# Patient Record
Sex: FEMALE | Race: AMERICAN INDIAN OR ALASKA NATIVE
[De-identification: names, ages, dates, MRNs, and addresses within clinical notes are randomized per-mention and may not be internally consistent; named-entity substitution may affect disease eponyms.]

---

## 2017-03-12 ENCOUNTER — HOSPITAL ENCOUNTER (INPATIENT)
Dept: HOSPITAL 5 - ED | Age: 72
LOS: 2 days | Discharge: HOME | DRG: 922 | End: 2017-03-14
Attending: INTERNAL MEDICINE | Admitting: INTERNAL MEDICINE
Payer: MEDICARE

## 2017-03-12 DIAGNOSIS — R41.0: ICD-10-CM

## 2017-03-12 DIAGNOSIS — F23: ICD-10-CM

## 2017-03-12 DIAGNOSIS — G93.40: ICD-10-CM

## 2017-03-12 DIAGNOSIS — E78.5: ICD-10-CM

## 2017-03-12 DIAGNOSIS — T68.XXXA: Primary | ICD-10-CM

## 2017-03-12 DIAGNOSIS — Z79.899: ICD-10-CM

## 2017-03-12 DIAGNOSIS — F29: ICD-10-CM

## 2017-03-12 DIAGNOSIS — F32.9: ICD-10-CM

## 2017-03-12 DIAGNOSIS — E05.90: ICD-10-CM
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
## 2017-03-12 DIAGNOSIS — I10: ICD-10-CM

## 2017-03-12 DIAGNOSIS — E86.0: ICD-10-CM

## 2017-03-12 LAB
ALBUMIN SERPL-MCNC: 4.1 G/DL (ref 3.9–5)
ALBUMIN/GLOB SERPL: 0.9 %
ALP SERPL-CCNC: 58 UNITS/L (ref 35–129)
ALT SERPL-CCNC: 15 UNITS/L (ref 7–56)
ANION GAP SERPL CALC-SCNC: 22 MMOL/L
BASOPHILS NFR BLD AUTO: 0.4 % (ref 0–1.8)
BILIRUB SERPL-MCNC: 0.6 MG/DL (ref 0.1–1.2)
BILIRUB UR QL STRIP: (no result)
BLOOD UR QL VISUAL: (no result)
BUN SERPL-MCNC: 31 MG/DL (ref 7–17)
BUN/CREAT SERPL: 51.66 %
CALCIUM SERPL-MCNC: 10 MG/DL (ref 8.4–10.2)
CHLORIDE SERPL-SCNC: 95.8 MMOL/L (ref 98–107)
CO2 SERPL-SCNC: 24 MMOL/L (ref 22–30)
EOSINOPHIL NFR BLD AUTO: 0.5 % (ref 0–4.3)
GLUCOSE SERPL-MCNC: 108 MG/DL (ref 65–100)
HCT VFR BLD CALC: 46.6 % (ref 30.3–42.9)
HGB BLD-MCNC: 15.2 GM/DL (ref 10.1–14.3)
KETONES UR STRIP-MCNC: 20 MG/DL
LEUKOCYTE ESTERASE UR QL STRIP: (no result)
MAGNESIUM SERPL-MCNC: 2.2 MG/DL (ref 1.7–2.3)
MCH RBC QN AUTO: 30 PG (ref 28–32)
MCHC RBC AUTO-ENTMCNC: 33 % (ref 30–34)
MCV RBC AUTO: 91 FL (ref 79–97)
MUCOUS THREADS #/AREA URNS HPF: (no result) /HPF
NITRITE UR QL STRIP: (no result)
PH UR STRIP: 5 [PH] (ref 5–7)
PLATELET # BLD: 139 K/MM3 (ref 140–440)
POTASSIUM SERPL-SCNC: 3.7 MMOL/L (ref 3.6–5)
PROT SERPL-MCNC: 8.5 G/DL (ref 6.3–8.2)
RBC # BLD AUTO: 5.14 M/MM3 (ref 3.65–5.03)
RBC #/AREA URNS HPF: < 1 /HPF (ref 0–6)
SODIUM SERPL-SCNC: 138 MMOL/L (ref 137–145)
URINE DRUGS OF ABUSE NOTE: (no result)
UROBILINOGEN UR-MCNC: < 2 MG/DL (ref ?–2)
WBC # BLD AUTO: 4.9 K/MM3 (ref 4.5–11)
WBC #/AREA URNS HPF: 1 /HPF (ref 0–6)

## 2017-03-12 PROCEDURE — 90732 PPSV23 VACC 2 YRS+ SUBQ/IM: CPT

## 2017-03-12 PROCEDURE — 84443 ASSAY THYROID STIM HORMONE: CPT

## 2017-03-12 PROCEDURE — 83735 ASSAY OF MAGNESIUM: CPT

## 2017-03-12 PROCEDURE — 93005 ELECTROCARDIOGRAM TRACING: CPT

## 2017-03-12 PROCEDURE — 81001 URINALYSIS AUTO W/SCOPE: CPT

## 2017-03-12 PROCEDURE — 82140 ASSAY OF AMMONIA: CPT

## 2017-03-12 PROCEDURE — 84439 ASSAY OF FREE THYROXINE: CPT

## 2017-03-12 PROCEDURE — 70450 CT HEAD/BRAIN W/O DYE: CPT

## 2017-03-12 PROCEDURE — 80053 COMPREHEN METABOLIC PANEL: CPT

## 2017-03-12 PROCEDURE — 85025 COMPLETE CBC W/AUTO DIFF WBC: CPT

## 2017-03-12 PROCEDURE — 80307 DRUG TEST PRSMV CHEM ANLYZR: CPT

## 2017-03-12 PROCEDURE — 93010 ELECTROCARDIOGRAM REPORT: CPT

## 2017-03-12 PROCEDURE — 87040 BLOOD CULTURE FOR BACTERIA: CPT

## 2017-03-12 PROCEDURE — 80320 DRUG SCREEN QUANTALCOHOLS: CPT

## 2017-03-12 PROCEDURE — 71020: CPT

## 2017-03-12 PROCEDURE — 36415 COLL VENOUS BLD VENIPUNCTURE: CPT

## 2017-03-12 PROCEDURE — G0480 DRUG TEST DEF 1-7 CLASSES: HCPCS

## 2017-03-12 NOTE — EMERGENCY DEPARTMENT REPORT
ED Altered Mental Status HPI





- General


Chief Complaint: Psych


Stated Complaint: HYPOTHERMIA


Time Seen by Provider: 03/12/17 09:43


Source: patient, EMS


Mode of arrival: Stretcher


Limitations: Altered Mental Status





- History of Present Illness


Initial Comments: 





71-year-old female with past medical history hypertension and high cholesterol 

presents to the hospital with hypothermia.  Patient was found by bystanders 

lying in the field on the ground.  Patient apparently had a message from God.  

She would not share that message with me however she stated to triage nurse 

that "I have sinned so I laid down to die"  and she is having problems with her 

son. Pt left home some time last night..  Temperature outside was in the high 

30s to 40s, cold, and raining.  Patient presented shivering and wet.  Patient 

denies any pain.  She has a son with schizophrenia but denies any personal 

psychiatric diagnoses.





- Related Data


 Previous Rx's











 Medication  Instructions  Recorded  Last Taken  Type


 


Docusate Sodium [Colace] 100 mg PO BID PRN #120 capsule 03/03/17 Unknown Rx











 Allergies











Allergy/AdvReac Type Severity Reaction Status Date / Time


 


No Known Allergies Allergy   Unverified 03/03/17 14:16














ED Review of Systems


ROS: 


Stated complaint: HYPOTHERMIA


Other details as noted in HPI





Comment: All other systems reviewed and negative


Other: 





Constitutional: No fevers chills 


Eyes: No eye pain visual changes 


ENT: No ear pain or throat pain


Neck: Denies pain


Respiratory: Denies cough wheezing shortness of breath 


Cardiovascular: Denies chest pain, palpitations, syncope


GI: Denies abdominal pain, nausea, vomiting, diarrhea


: Denies dysuria


Musculoskeletal: Denies back pain, joint swelling 


Skin: Denies rash, lesions, erythema


Neurologic: Denies headache, numbness, weakness








ED Past Medical Hx





- Past Medical History


Hx Hypertension: Yes


Additional medical history: high cholesterol





- Social History


Smoking Status: Never Smoker


Substance Use Type: None





- Medications


Home Medications: 


 Home Medications











 Medication  Instructions  Recorded  Confirmed  Last Taken  Type


 


Docusate Sodium [Colace] 100 mg PO BID PRN #120 capsule 03/03/17  Unknown Rx














ED Physical Exam





- General


Limitations: Altered Mental Status





- Other


Other exam information: 





General: No limitations, patient is alert in no acute distress


Head exam: Atraumatic, normocephalic


Eyes exam: Normal appearance, pupils equal reactive to light, extraocular 

movements intact


ENT: Moist mucous membrane, normal oropharynx


Neck exam: Normal inspection, full range of motion, no meningismus nontender


Respiratory exam: Clear to auscultation bilateral, no wheezes, rales, crackles


Cardiovascular: Normal rate and rhythm, normal heart sounds


Abdomen: Soft, nondistended, and  nontender, with normal bowel sounds, no 

rebound, or guarding


Extremity: Full range of motion normal inspection no deformity


Back: Normal Inspection, full range of motion, no tenderness


Neurologic: Alert, oriented x3, cranial nerves intact, no motor or sensory 

deficit


Psychiatric: normal affect, normal mood


Skin: Warm, dry, intact





ED Course


 Vital Signs











  03/12/17





  09:32


 


Temperature 94.7 F L


 


Pulse Rate 63


 


Respiratory 16





Rate 


 


Blood Pressure 129/72


 


O2 Sat by Pulse 100





Oximetry 














- Reevaluation(s)


Reevaluation #1: 





03/12/17 10:45


Active warming initiated Shortly after patient arrival


03/12/17 10:45








- Lab Data


Result diagrams: 


 03/12/17 09:50





 03/12/17 09:50


 Lab Results











  03/12/17 03/12/17 03/12/17 Range/Units





  09:39 09:39 09:50 


 


WBC    4.9  (4.5-11.0)  K/mm3


 


RBC    5.14 H  (3.65-5.03)  M/mm3


 


Hgb    15.2 H  (10.1-14.3)  gm/dl


 


Hct    46.6 H  (30.3-42.9)  %


 


MCV    91  (79-97)  fl


 


MCH    30  (28-32)  pg


 


MCHC    33  (30-34)  %


 


RDW    13.2  (13.2-15.2)  %


 


Plt Count    139 L  (140-440)  K/mm3


 


Lymph % (Auto)    22.3  (13.4-35.0)  %


 


Mono % (Auto)    9.6 H  (0.0-7.3)  %


 


Eos % (Auto)    0.5  (0.0-4.3)  %


 


Baso % (Auto)    0.4  (0.0-1.8)  %


 


Lymph #    1.1 L  (1.2-5.4)  K/mm3


 


Mono #    0.5  (0.0-0.8)  K/mm3


 


Eos #    0.0  (0.0-0.4)  K/mm3


 


Baso #    0.0  (0.0-0.1)  K/mm3


 


Seg Neutrophils %    67.2  (40.0-70.0)  %


 


Seg Neutrophils #    3.3  (1.8-7.7)  K/mm3


 


Sodium     (137-145)  mmol/L


 


Potassium     (3.6-5.0)  mmol/L


 


Chloride     ()  mmol/L


 


Carbon Dioxide     (22-30)  mmol/L


 


Anion Gap     mmol/L


 


BUN     (7-17)  mg/dL


 


Creatinine     (0.7-1.2)  mg/dL


 


Estimated GFR     ml/min


 


BUN/Creatinine Ratio     %


 


Glucose     ()  mg/dL


 


Lactic Acid     (0.7-2.0)  mmol/L


 


Calcium     (8.4-10.2)  mg/dL


 


Magnesium     (1.7-2.3)  mg/dL


 


Total Bilirubin     (0.1-1.2)  mg/dL


 


AST     (5-40)  units/L


 


ALT     (7-56)  units/L


 


Alkaline Phosphatase     ()  units/L


 


Total Protein     (6.3-8.2)  g/dL


 


Albumin     (3.9-5)  g/dL


 


Albumin/Globulin Ratio     %


 


Urine Color  Yellow    (Yellow)  


 


Urine Turbidity  Clear    (Clear)  


 


Urine pH  5.0    (5.0-7.0)  


 


Ur Specific Gravity  1.028    (1.003-1.030)  


 


Urine Protein  30 mg/dl    (Negative)  mg/dL


 


Urine Glucose (UA)  Neg    (Negative)  mg/dL


 


Urine Ketones  20    (Negative)  mg/dL


 


Urine Blood  Neg    (Negative)  


 


Urine Nitrite  Neg    (Negative)  


 


Urine Bilirubin  Neg    (Negative)  


 


Urine Urobilinogen  < 2.0    (<2.0)  mg/dL


 


Ur Leukocyte Esterase  Neg    (Negative)  


 


Urine WBC (Auto)  1.0    (0.0-6.0)  /HPF


 


Urine RBC (Auto)  < 1.0    (0.0-6.0)  /HPF


 


Urine Mucus  1+    /HPF


 


Urine Opiates Screen   Presumptive negative   


 


Urine Methadone Screen   Presumptive negative   


 


Acetaminophen     (10.0-30.0)  ug/mL


 


Ur Barbiturates Screen   Presumptive negative   


 


Ur Phencyclidine Scrn   Presumptive negative   


 


Ur Amphetamines Screen   Presumptive negative   


 


U Benzodiazepines Scrn   Presumptive negative   


 


Urine Cocaine Screen   Presumptive negative   


 


U Marijuana (THC) Screen   Presumptive negative   


 


Drugs of Abuse Note   Disclamer   


 


Plasma/Serum Alcohol     (0-0.07)  gm%














  03/12/17 03/12/17 03/12/17 Range/Units





  09:50 09:50 09:50 


 


WBC     (4.5-11.0)  K/mm3


 


RBC     (3.65-5.03)  M/mm3


 


Hgb     (10.1-14.3)  gm/dl


 


Hct     (30.3-42.9)  %


 


MCV     (79-97)  fl


 


MCH     (28-32)  pg


 


MCHC     (30-34)  %


 


RDW     (13.2-15.2)  %


 


Plt Count     (140-440)  K/mm3


 


Lymph % (Auto)     (13.4-35.0)  %


 


Mono % (Auto)     (0.0-7.3)  %


 


Eos % (Auto)     (0.0-4.3)  %


 


Baso % (Auto)     (0.0-1.8)  %


 


Lymph #     (1.2-5.4)  K/mm3


 


Mono #     (0.0-0.8)  K/mm3


 


Eos #     (0.0-0.4)  K/mm3


 


Baso #     (0.0-0.1)  K/mm3


 


Seg Neutrophils %     (40.0-70.0)  %


 


Seg Neutrophils #     (1.8-7.7)  K/mm3


 


Sodium  138    (137-145)  mmol/L


 


Potassium  3.7    (3.6-5.0)  mmol/L


 


Chloride  95.8 L    ()  mmol/L


 


Carbon Dioxide  24    (22-30)  mmol/L


 


Anion Gap  22    mmol/L


 


BUN  31 H    (7-17)  mg/dL


 


Creatinine  0.6 L    (0.7-1.2)  mg/dL


 


Estimated GFR  > 60    ml/min


 


BUN/Creatinine Ratio  51.66    %


 


Glucose  108 H    ()  mg/dL


 


Lactic Acid   1.9   (0.7-2.0)  mmol/L


 


Calcium  10.0    (8.4-10.2)  mg/dL


 


Magnesium  2.2    (1.7-2.3)  mg/dL


 


Total Bilirubin  0.6    (0.1-1.2)  mg/dL


 


AST  23    (5-40)  units/L


 


ALT  15    (7-56)  units/L


 


Alkaline Phosphatase  58    ()  units/L


 


Total Protein  8.5 H    (6.3-8.2)  g/dL


 


Albumin  4.1    (3.9-5)  g/dL


 


Albumin/Globulin Ratio  0.9    %


 


Urine Color     (Yellow)  


 


Urine Turbidity     (Clear)  


 


Urine pH     (5.0-7.0)  


 


Ur Specific Gravity     (1.003-1.030)  


 


Urine Protein     (Negative)  mg/dL


 


Urine Glucose (UA)     (Negative)  mg/dL


 


Urine Ketones     (Negative)  mg/dL


 


Urine Blood     (Negative)  


 


Urine Nitrite     (Negative)  


 


Urine Bilirubin     (Negative)  


 


Urine Urobilinogen     (<2.0)  mg/dL


 


Ur Leukocyte Esterase     (Negative)  


 


Urine WBC (Auto)     (0.0-6.0)  /HPF


 


Urine RBC (Auto)     (0.0-6.0)  /HPF


 


Urine Mucus     /HPF


 


Urine Opiates Screen     


 


Urine Methadone Screen     


 


Acetaminophen    < 15.0  (10.0-30.0)  ug/mL


 


Ur Barbiturates Screen     


 


Ur Phencyclidine Scrn     


 


Ur Amphetamines Screen     


 


U Benzodiazepines Scrn     


 


Urine Cocaine Screen     


 


U Marijuana (THC) Screen     


 


Drugs of Abuse Note     


 


Plasma/Serum Alcohol     (0-0.07)  gm%














  03/12/17 Range/Units





  09:50 


 


WBC   (4.5-11.0)  K/mm3


 


RBC   (3.65-5.03)  M/mm3


 


Hgb   (10.1-14.3)  gm/dl


 


Hct   (30.3-42.9)  %


 


MCV   (79-97)  fl


 


MCH   (28-32)  pg


 


MCHC   (30-34)  %


 


RDW   (13.2-15.2)  %


 


Plt Count   (140-440)  K/mm3


 


Lymph % (Auto)   (13.4-35.0)  %


 


Mono % (Auto)   (0.0-7.3)  %


 


Eos % (Auto)   (0.0-4.3)  %


 


Baso % (Auto)   (0.0-1.8)  %


 


Lymph #   (1.2-5.4)  K/mm3


 


Mono #   (0.0-0.8)  K/mm3


 


Eos #   (0.0-0.4)  K/mm3


 


Baso #   (0.0-0.1)  K/mm3


 


Seg Neutrophils %   (40.0-70.0)  %


 


Seg Neutrophils #   (1.8-7.7)  K/mm3


 


Sodium   (137-145)  mmol/L


 


Potassium   (3.6-5.0)  mmol/L


 


Chloride   ()  mmol/L


 


Carbon Dioxide   (22-30)  mmol/L


 


Anion Gap   mmol/L


 


BUN   (7-17)  mg/dL


 


Creatinine   (0.7-1.2)  mg/dL


 


Estimated GFR   ml/min


 


BUN/Creatinine Ratio   %


 


Glucose   ()  mg/dL


 


Lactic Acid   (0.7-2.0)  mmol/L


 


Calcium   (8.4-10.2)  mg/dL


 


Magnesium   (1.7-2.3)  mg/dL


 


Total Bilirubin   (0.1-1.2)  mg/dL


 


AST   (5-40)  units/L


 


ALT   (7-56)  units/L


 


Alkaline Phosphatase   ()  units/L


 


Total Protein   (6.3-8.2)  g/dL


 


Albumin   (3.9-5)  g/dL


 


Albumin/Globulin Ratio   %


 


Urine Color   (Yellow)  


 


Urine Turbidity   (Clear)  


 


Urine pH   (5.0-7.0)  


 


Ur Specific Gravity   (1.003-1.030)  


 


Urine Protein   (Negative)  mg/dL


 


Urine Glucose (UA)   (Negative)  mg/dL


 


Urine Ketones   (Negative)  mg/dL


 


Urine Blood   (Negative)  


 


Urine Nitrite   (Negative)  


 


Urine Bilirubin   (Negative)  


 


Urine Urobilinogen   (<2.0)  mg/dL


 


Ur Leukocyte Esterase   (Negative)  


 


Urine WBC (Auto)   (0.0-6.0)  /HPF


 


Urine RBC (Auto)   (0.0-6.0)  /HPF


 


Urine Mucus   /HPF


 


Urine Opiates Screen   


 


Urine Methadone Screen   


 


Acetaminophen   (10.0-30.0)  ug/mL


 


Ur Barbiturates Screen   


 


Ur Phencyclidine Scrn   


 


Ur Amphetamines Screen   


 


U Benzodiazepines Scrn   


 


Urine Cocaine Screen   


 


U Marijuana (THC) Screen   


 


Drugs of Abuse Note   


 


Plasma/Serum Alcohol  < 0.01  (0-0.07)  gm%














- EKG Data


-: EKG Interpreted by Me (nsr rate 60)


When compared to previous EKG there are: no significant change (compared to 3/3/

17)





- Medical Decision Making





She will be admitted to the hospital for further treatment of hyperthermia.  

Thyroid function tests pending at disposition.  Patient may also a mental 

health evaluation of psychosis does not improve with treatment.  Patient also 

has mild dehydration as evident by elevated BUN.  Normal saline initiated





- Differential Diagnosis


dementia, psychosis, infection, delirium


Critical Care Time: No


Critical care attestation.: 


If time is entered above; I have spent that time in minutes in the direct care 

of this critically ill patient, excluding procedure time.








ED Disposition


Clinical Impression: 


 Psychosis, Dehydration





Hypothermia


Qualifiers:


 Encounter type: initial encounter Qualified Code(s): T68.XXXA - Hypothermia, 

initial encounter





Disposition: OP ADMITTED AS IP TO THIS HOSP


Is pt being admited?: Yes


Condition: Stable


Time of Disposition: 11:23 (Dr Jansen/hosp)

## 2017-03-12 NOTE — CAT SCAN REPORT
CT HEAD WITHOUT CONTRAST:



HISTORY: Intermittent confusion, altered mental status.



TECHNIQUE:

Sequential CT images without contrast.



FINDINGS:

Images obtained show bilateral prominence of the sulci and

ventricles.  There are no abnormal intra- or extra-axial blood or

fluid collections.  There are no focal masses or evidence of mass

effect. The gray white matter differentiation appears within normal 

limits.  Regions of periventricular decreased attenuation are 

consistent with microangiopathic ischemic disease.  The posterior fossa 

structures including the fourth ventricle, cerebellum, and brainstem 

appear normal.



IMPRESSION:

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  No acute 

intracranial process noted.

## 2017-03-12 NOTE — EVENT NOTE
Date: 03/12/17





See H/p in reports


Hypothermia


Sepsis??-Empiric Zosyn which maybe discontinued on 2nd on day 2 if no source of 

infection identified


HTN


HLD

## 2017-03-13 LAB
BASOPHILS NFR BLD AUTO: 0.1 % (ref 0–1.8)
EOSINOPHIL NFR BLD AUTO: 0 % (ref 0–4.3)
HCT VFR BLD CALC: 40.1 % (ref 30.3–42.9)
HGB BLD-MCNC: 13 GM/DL (ref 10.1–14.3)
MCH RBC QN AUTO: 29 PG (ref 28–32)
MCHC RBC AUTO-ENTMCNC: 32 % (ref 30–34)
MCV RBC AUTO: 90 FL (ref 79–97)
PLATELET # BLD: 118 K/MM3 (ref 140–440)
RBC # BLD AUTO: 4.44 M/MM3 (ref 3.65–5.03)
WBC # BLD AUTO: 12.6 K/MM3 (ref 4.5–11)

## 2017-03-13 RX ADMIN — PIPERACILLIN AND TAZOBACTAM SCH MLS/HR: 4; .5 INJECTION, POWDER, FOR SOLUTION INTRAVENOUS at 00:50

## 2017-03-13 RX ADMIN — PIPERACILLIN AND TAZOBACTAM SCH MLS/HR: 4; .5 INJECTION, POWDER, FOR SOLUTION INTRAVENOUS at 09:38

## 2017-03-13 NOTE — CONSULTATION
History of Present Illness





- Reason for Consult


Consult date: 17


Reason for consult: altered mental status





- Chief Complaint


Chief complaint: 





found wandering stating she heard a message from God





- History of Present Psychiatric Illness





Ms. Alfonso is a 71 year old black female seen on the medical floor for 

psychiatric consultation for altered mental status. She was found wandering in 

a field last night saying she heard a message from god telling her " I have 

sinned, so I laid down to die." This is per the record. The patient was in 

restraints when examined. She is not aware of her surroundings. 





Her daughter gave the following history:


The patient's   in 2016.


Her daughter stayed with her for 3 months


The patient was eating and sleeping well during that time


Toward the end of the her daughter's stay, the patient began accusing her of 

stealing, tapping the phone, and thought someone was hacking the computer


The daughter left recently


In the last 2 weeks, her behavior became erratic with increasing paranoia.


She stopped sleeping and ate minimally. Daughter reports she has lost weight in 

a short period of time





No known psychiatric history


No substance abuse according to the daughter


No memory disturbances prior to the death of her 


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 

worsened.





Medications and Allergies


 Allergies











Allergy/AdvReac Type Severity Reaction Status Date / Time


 


No Known Allergies Allergy   Unverified 17 14:16











 Home Medications











 Medication  Instructions  Recorded  Confirmed  Last Taken  Type


 


Fish Oil 1,200 mg Softgel 1,200 mg PO BID 17 Unknown History


 


Simvastatin [Zocor TAB] 40 mg PO QHS 17 Unknown History


 


Triamterene  mg PO QDAY 17  Unknown History


 


Verapamil [Calan] 360 mg PO QDAY 17 Unknown History


 


Vitamin D3 5,000 unit PO QDAY 17 Unknown History











Active Meds: 


Active Medications





Enoxaparin Sodium (Lovenox)  40 mg SUB-Q QDAY@2200 DASHAWN


Simvastatin (Zocor)  40 mg PO QHS DASHAWN


Verapamil HCl (Calan)  360 mg PO QDAY DASHAWN











Past psychiatric history





- Past Medical History


Past Medical History: hypertension





- past Psychiatric treatment and history


psychiatric treatment history: 


none





- Social History


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





Mental Status Exam





- Vital signs


 Last Vital Signs











Temp  98.1 F   17 23:44


 


Pulse  71   17 23:44


 


Resp  18   17 23:44


 


BP  114/61   17 23:44


 


Pulse Ox  99   17 23:44














- Exam


Narrative exam: 





On exam, the patient told this author to "go away." 


She is unaware of surroundings


minimal sleep according to her daughter.


Affect: agitated


Mood: other (unable to assess)


Thought content: paranoia


Thought Process: Disoriented


Perceptions: other (ideation that someone is out to get her)


Speech: other (yelling)


Concentration: unable to pay attention


Motor activity: agitated


Level of consciousness: confused


Appetite: decreased





Results


Result Diagrams: 


 17 09:50





 17 09:50


All other labs normal.








Assessment and Plan


Assessment and plan: 


Assessment:


Altered mental status:


delirium


complicated grief





Medical conditions addressed by her attending provider








Recommendation: Haldol 1mg or 2mg po or IM q 12 hours scheduled to treat 

perceptual disturbances and agitation

## 2017-03-13 NOTE — HISTORY AND PHYSICAL REPORT
CHIEF COMPLAINT:  Hypothermia, low ultra sensorium.



HISTORY OF PRESENT ILLNESS:  This is a 71-year-old female with past medical

history of hypertension, high cholesterol, brought into the hospital for

hypothermia by EMS.  The patient was found by bystanders lying on the field on

the ground.  The patient says that the patient apparently had a message from the

God, and apparently she was told by the God that she has sinned and so she laid

down to die.  She is also having problems with her son.  Temperature outside was

in the 30s to 40s and was raining and cold.  The patient presented with _____. 

The patient has a son with schizophrenia.  The patient also has a history of

some schizophrenia, which is not affecting.



PAST MEDICAL HISTORY:  No fever, no chills.  No cough.



PAST MEDICAL HISTORY:  Significant for hypertension and high cholesterol.



SOCIAL HISTORY:  Does not smoke.



MEDICATIONS:  Colace 100 mg twice a day and blood pressure medicines in the form

verapamil 360 mg once a day, triamterene once a day, vitamin D once a day,

simvastatin 40 mg p.o. daily.  Fish oil once a day.



FAMILY HISTORY:  No hypertension.



REVIEW OF SYSTEMS:

CONSTITUTIONAL:  No fever, no chills.  _____ temperature.

HEENT:  No sore throat.  No postnasal drip.

CARDIOVASCULAR SYSTEM AND RESPIRATORY SYSTEM:  No shortness of breath.  No chest

pain.

GASTROINTESTINAL:  No nausea, no vomiting.

GENITOURINARY:  No dysuria, no flank pain.

MUSCULOSKELETAL:  No joint pains, no muscle pains.

CENTRAL NERVOUS SYSTEM:  No syncope, no seizures.

PSYCH:  She has hallucinations.  The God is giving her instructions.  Delusions.

SKIN:  No rashes.

A 14-point review of systems is essentially negative.



PHYSICAL EXAMINATION:

GENERAL:  Elderly female, cooperative during examination.

VITAL SIGNS:  Temperature was 94.7, pulse 63, respirations 16, blood pressure

129/72.

HEENT:  Unremarkable.  Tongue is slightly dry.

NECK:  Supple, no lymphadenopathy, no thyromegaly.

LUNGS:  Clear to auscultation and percussion.  Good air entry.

CARDIOVASCULAR:  S1, S2 heard.  No gallop, no murmur, no rub.  Apical impulse in

left fifth intercostal space and midclavicular line.

ABDOMEN:  Soft and benign.  No hepatosplenomegaly.  No guarding, no rigidity. 

Hernial orifices are normal.

EXTREMITIES:  Show good pedal pulses.  No pedal edema.

CENTRAL NERVOUS SYSTEM:  Alert and oriented x 3.

NEUROLOGIC:  Nonfocal exam.

SKIN:  Normal.



LABORATORY DATA:  Significant for white count of 4900, hemoglobin of 15.2,

hematocrit of 46.6, platelet count is 139,000.  Sodium is 138, potassium is 3.7,

chloride is 95.8, bicarbonate is 24, BUN and creatinine 31 and 1.6, glucose is

108.  Urine is concentrated 1.0, specific gravity of 1.028.



ASSESSMENT AND PLAN:

1.  Hypothermia probably secondary to exposure to cold, the commonest cause. 

Sepsis to be ruled out.  Empiric Zosyn started and warm blankets started.  Zosyn

can be discontinued on day 2 or day 3 of no focus of infection found.

2.  Acute dehydration, IV fluids for the time being.  BUN and creatinine is

31.6.

3.  Delusions and hallucinations.  Mental health consult requested.

4.  Hypertension, verapamil was kept on hold, to be restarted once the blood

pressure starts going up.

5.  Hyperlipidemia.  Simvastatin on hold.

6.  Deep venous thrombosis prophylaxis.  Lovenox 40 mg subcutaneous daily.



In summary, check mental health consult.  Zosyn to be discontinued on day 2 or

day 3 depending on blood cultures and chest x-ray.





DD: 03/13/2017 01:59

DT: 03/13/2017 02:44

JOB# [id number]  [id number]

VSM/NTS

## 2017-03-13 NOTE — ADMIT CRITERIA FORM
Admission Criteria Documentation: 





                                             DEHYDRATION





Clinical Indications for Admission to Inpatient Care





                                                         (Place 'X' for any and 

all applicable criteria):





Admission is indicated for ANY ONE of the following (1)(2)(3)(4)(5):


[X]I.    Inpatient admission required rather than observation care (see 

Dehydration: Observation Care guideline 


         as appropriate) because of ANY ONE of the following:


         [ ]a)   Vomiting that is severe or persistent


         [ ]b)   Severe electrolyte abnormalities requiring inpatient care


         [ ]c)   Hemodynamic instability 


         [X]d)   IV fluid to replace significant ongoing losses (greater than 3 

L/m2 per day (10) (11)


         [ ]e)   Parenteral nutrition regimen that must be implemented on 

inpatient basis


         [X]f)    Other condition,treatment or monitoring requiring inpatient 

admission


[ ]II.   Serious cause for dehydration requiring acute hospitalization (eg, 

bowel obstruction, increased intracranial 


         pressure, infectious cause)











Extended stay beyond goal length of stay may be needed for(1)(3 )(4)(17):


[ ]a)    Chronic severe dehydration 


[ ]b)    Persistent vital sign changes, severe electrolyte imbalance, or 

diagnosed cause of dehydration that requires 


          continued hospitalization (eg, bowel  obstruction, increased 

intracranial pressure)


[ ]c)    Older patients (65 years or older) 


[ ]d)    Severe comorbid illness (eg, renal failure, heart failure, poorly 

controlled diabetes)











The original [employer] content created by [employer] has been revised. 


The portions of the content which have been revised are identified through the 

use of italic text or in bold, and [facility] 


has neither reviewed nor approved the modified material. All other unmodified 

content is copyright  [employer].





Please see references footnoted in the original [facility] edition 

2016


Admission Criteria Met: Yes

## 2017-03-13 NOTE — PROGRESS NOTE
Assessment and Plan


Assessment and plan: 


Patient is a pleasant 71-year-old female with past medical history hypertension 

and high cholesterol presents to the hospital with hypothermia.  Patient was 

found by bystanders lying in the field on the ground.  Patient apparently had a 

message from God.  She would not share that message with me however she stated 

to triage nurse that "I have sinned so I laid down to die"  and she is having 

problems with her son. Pt left home some time last night..  Temperature outside 

was in the high 30s to 40s, cold, and raining.  Patient presented shivering and 

wet.  Patient denies any pain.  She has a son with schizophrenia but denies any 

personal psychiatric diagnoses.  Information obtained from records in the ER





* Acute psychosis


* Possible prolonged grief


* Depression and very to recent death of 


* Probable hyperthyroidism


* Hypothymia-now resolved 


* Hypertension


* Hyperlipidemia


Plan


* Patient's hypothymia likely secondary to exposure to the elements of weather.


* Symptoms are likely secondary to prolonged grief from recent passing of her 

.  We'll await psychiatric evaluation 


* We'll discontinue antibiotics not evidence of infection at this time 


* Restart home medications for blood pressure cholesterol 


* Plan of care has been discussed in detail with the daughter.  Patient's 

request she actually asked us to step outside and talk.  


* DVT and GI prophylaxis 





History


Interval history: 


Patient seen and examined in no acute distress but refusing examination or to 

speak with me. Daughter informs me that they just lost their father (the 

patients ) in Nov 2016 and the it has been really hard on the patient. 

No other adverse event reported. 








Hospitalist Physical





- Physical exam


Narrative exam: 


 VITAL SIGNS:  Reviewed.    


GENERAL:  The patient appeared well nourished and normally developed. Vital 

signs as documented.


HEAD:  No signs of head trauma.


EYES:   Extraocular motions intact.  


EARS:  Hearing grossly intact.


MOUTH:  Oropharynx is normal. 


NECK:  No adenopathy, no JVD.   


CHEST: Unable to obtain as patient refused exam.  Respiration appears 

symmetrical not assess her muscle use observed


CARDIAC:  Unable to examine as patient refuses exam


VASCULAR: Unable to examine


ABDOMEN: Unable to examine the patient refused.


MUSCULOSKELETAL: Observed Good range of motion of all major joints.  Cyanosis 

observed


NEUROLOGIC EXAM:  Alert and oriented x 3.  Withdrawn other focal exams could 

not be examined appropriately as patient compliant 


PSYCHIATRIC:  Mood withdrawn


SKIN:  No rash or lesions observed.














- Constitutional


Vitals: 


 











Temp Pulse Resp BP Pulse Ox


 


 98.1 F   71   18   114/61   99 


 


 03/12/17 23:44  03/12/17 23:44  03/12/17 23:44  03/12/17 23:44  03/12/17 23:44














Results





- Labs


CBC & Chem 7: 


 03/12/17 09:50





 03/12/17 09:50


Labs: 


 Laboratory Last Values











WBC  4.9 K/mm3 (4.5-11.0)   03/12/17  09:50    


 


RBC  5.14 M/mm3 (3.65-5.03)  H  03/12/17  09:50    


 


Hgb  15.2 gm/dl (10.1-14.3)  H  03/12/17  09:50    


 


Hct  46.6 % (30.3-42.9)  H  03/12/17  09:50    


 


MCV  91 fl (79-97)   03/12/17  09:50    


 


MCH  30 pg (28-32)   03/12/17  09:50    


 


MCHC  33 % (30-34)   03/12/17  09:50    


 


RDW  13.2 % (13.2-15.2)   03/12/17  09:50    


 


Plt Count  139 K/mm3 (140-440)  L  03/12/17  09:50    


 


Lymph % (Auto)  22.3 % (13.4-35.0)   03/12/17  09:50    


 


Mono % (Auto)  9.6 % (0.0-7.3)  H  03/12/17  09:50    


 


Eos % (Auto)  0.5 % (0.0-4.3)   03/12/17  09:50    


 


Baso % (Auto)  0.4 % (0.0-1.8)   03/12/17  09:50    


 


Lymph #  1.1 K/mm3 (1.2-5.4)  L  03/12/17  09:50    


 


Mono #  0.5 K/mm3 (0.0-0.8)   03/12/17  09:50    


 


Eos #  0.0 K/mm3 (0.0-0.4)   03/12/17  09:50    


 


Baso #  0.0 K/mm3 (0.0-0.1)   03/12/17  09:50    


 


Seg Neutrophils %  67.2 % (40.0-70.0)   03/12/17  09:50    


 


Seg Neutrophils #  3.3 K/mm3 (1.8-7.7)   03/12/17  09:50    


 


Sodium  138 mmol/L (137-145)   03/12/17  09:50    


 


Potassium  3.7 mmol/L (3.6-5.0)   03/12/17  09:50    


 


Chloride  95.8 mmol/L ()  L  03/12/17  09:50    


 


Carbon Dioxide  24 mmol/L (22-30)   03/12/17  09:50    


 


Anion Gap  22 mmol/L  03/12/17  09:50    


 


BUN  31 mg/dL (7-17)  H  03/12/17  09:50    


 


Creatinine  0.6 mg/dL (0.7-1.2)  L  03/12/17  09:50    


 


Estimated GFR  > 60 ml/min  03/12/17  09:50    


 


BUN/Creatinine Ratio  51.66 %  03/12/17  09:50    


 


Glucose  108 mg/dL ()  H  03/12/17  09:50    


 


Lactic Acid  1.9 mmol/L (0.7-2.0)   03/12/17  09:50    


 


Calcium  10.0 mg/dL (8.4-10.2)   03/12/17  09:50    


 


Magnesium  2.2 mg/dL (1.7-2.3)   03/12/17  09:50    


 


Total Bilirubin  0.6 mg/dL (0.1-1.2)   03/12/17  09:50    


 


AST  23 units/L (5-40)   03/12/17  09:50    


 


ALT  15 units/L (7-56)   03/12/17  09:50    


 


Alkaline Phosphatase  58 units/L ()   03/12/17  09:50    


 


Total Protein  8.5 g/dL (6.3-8.2)  H  03/12/17  09:50    


 


Albumin  4.1 g/dL (3.9-5)   03/12/17  09:50    


 


Albumin/Globulin Ratio  0.9 %  03/12/17  09:50    


 


TSH  0.976 mlU/mL (0.270-4.200)   03/12/17  10:02    


 


Free T4  1.67 ng/dL (0.76-1.46)  H  03/12/17  10:02    


 


Urine Color  Yellow  (Yellow)   03/12/17  09:39    


 


Urine Turbidity  Clear  (Clear)   03/12/17  09:39    


 


Urine pH  5.0  (5.0-7.0)   03/12/17  09:39    


 


Ur Specific Gravity  1.028  (1.003-1.030)   03/12/17  09:39    


 


Urine Protein  30 mg/dl mg/dL (Negative)   03/12/17  09:39    


 


Urine Glucose (UA)  Neg mg/dL (Negative)   03/12/17  09:39    


 


Urine Ketones  20 mg/dL (Negative)   03/12/17  09:39    


 


Urine Blood  Neg  (Negative)   03/12/17  09:39    


 


Urine Nitrite  Neg  (Negative)   03/12/17  09:39    


 


Urine Bilirubin  Neg  (Negative)   03/12/17  09:39    


 


Urine Urobilinogen  < 2.0 mg/dL (<2.0)   03/12/17  09:39    


 


Ur Leukocyte Esterase  Neg  (Negative)   03/12/17  09:39    


 


Urine WBC (Auto)  1.0 /HPF (0.0-6.0)   03/12/17  09:39    


 


Urine RBC (Auto)  < 1.0 /HPF (0.0-6.0)   03/12/17  09:39    


 


Urine Mucus  1+ /HPF  03/12/17  09:39    


 


Salicylates  < 0.3 mg/dL (2.8-20.0)  L  03/12/17  09:50    


 


Urine Opiates Screen  Presumptive negative   03/12/17  09:39    


 


Urine Methadone Screen  Presumptive negative   03/12/17  09:39    


 


Acetaminophen  < 15.0 ug/mL (10.0-30.0)   03/12/17  09:50    


 


Ur Barbiturates Screen  Presumptive negative   03/12/17  09:39    


 


Ur Phencyclidine Scrn  Presumptive negative   03/12/17  09:39    


 


Ur Amphetamines Screen  Presumptive negative   03/12/17  09:39    


 


U Benzodiazepines Scrn  Presumptive negative   03/12/17  09:39    


 


Urine Cocaine Screen  Presumptive negative   03/12/17  09:39    


 


U Marijuana (THC) Screen  Presumptive negative   03/12/17  09:39    


 


Drugs of Abuse Note  Disclamer   03/12/17  09:39    


 


Plasma/Serum Alcohol  < 0.01 gm% (0-0.07)   03/12/17  09:50    














- Imaging and Cardiology


CT Scan - head: image reviewed (no acute pathology)

## 2017-03-14 VITALS — SYSTOLIC BLOOD PRESSURE: 115 MMHG | DIASTOLIC BLOOD PRESSURE: 78 MMHG

## 2017-03-14 LAB
ALBUMIN SERPL-MCNC: 3.4 G/DL (ref 3.9–5)
ALBUMIN/GLOB SERPL: 0.9 %
ALP SERPL-CCNC: 51 UNITS/L (ref 35–129)
ALT SERPL-CCNC: 13 UNITS/L (ref 7–56)
ANION GAP SERPL CALC-SCNC: 24 MMOL/L
BASOPHILS NFR BLD AUTO: 0.3 % (ref 0–1.8)
BILIRUB SERPL-MCNC: 0.6 MG/DL (ref 0.1–1.2)
BUN SERPL-MCNC: 19 MG/DL (ref 7–17)
BUN/CREAT SERPL: 31.66 %
CALCIUM SERPL-MCNC: 9.3 MG/DL (ref 8.4–10.2)
CHLORIDE SERPL-SCNC: 102.3 MMOL/L (ref 98–107)
CO2 SERPL-SCNC: 19 MMOL/L (ref 22–30)
EOSINOPHIL NFR BLD AUTO: 0.1 % (ref 0–4.3)
GLUCOSE SERPL-MCNC: 97 MG/DL (ref 65–100)
HCT VFR BLD CALC: 38.8 % (ref 30.3–42.9)
HGB BLD-MCNC: 12.9 GM/DL (ref 10.1–14.3)
MCH RBC QN AUTO: 30 PG (ref 28–32)
MCHC RBC AUTO-ENTMCNC: 33 % (ref 30–34)
MCV RBC AUTO: 90 FL (ref 79–97)
PLATELET # BLD: 131 K/MM3 (ref 140–440)
POTASSIUM SERPL-SCNC: 3.2 MMOL/L (ref 3.6–5)
PROT SERPL-MCNC: 7.4 G/DL (ref 6.3–8.2)
RBC # BLD AUTO: 4.3 M/MM3 (ref 3.65–5.03)
SODIUM SERPL-SCNC: 142 MMOL/L (ref 137–145)
WBC # BLD AUTO: 10.2 K/MM3 (ref 4.5–11)

## 2017-03-14 NOTE — XRAY REPORT
Chest 2 views.



Findings: The heart and lungs reveal no acute findings or interval 

changes since March 3, 2017.
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
## 2017-03-14 NOTE — PROGRESS NOTE
Subjective





- Reason for Consult


Consult date: 03/14/17


Reason for consult: AMS





- Chief Complaint


Chief complaint: 





Patient was alert and oriented today. she was conversant and engaged in a 

discussion about her recent hospitalization. 





Mental Status Exam





- Vital signs


 Last Vital Signs











Temp  98.3 F   03/14/17 08:00


 


Pulse  100 H  03/14/17 09:02


 


Resp  18   03/14/17 08:00


 


BP  115/78   03/14/17 09:02


 


Pulse Ox  98   03/14/17 08:00














- Exam


Orientation: time, place, person


Affect: normal


Mood: appropriate


Thought Process: Intact


Perceptions: none


Speech: normal rate and pattern


Motor activity: normal


Level of consciousness: alert


Memory: Intact





Assessment and Plan





Impression:


AMS resolved 





Recommendation:


Follow up with neurology or geriatric psychiatry to assess for dementia

## 2019-09-07 ENCOUNTER — HOSPITAL ENCOUNTER (INPATIENT)
Dept: HOSPITAL 5 - ED | Age: 74
LOS: 9 days | Discharge: HOME | DRG: 536 | End: 2019-09-16
Attending: INTERNAL MEDICINE | Admitting: INTERNAL MEDICINE
Payer: MEDICARE
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
## 2019-09-07 DIAGNOSIS — Z79.899: ICD-10-CM

## 2019-09-07 DIAGNOSIS — E86.0: ICD-10-CM

## 2019-09-07 DIAGNOSIS — F32.9: ICD-10-CM
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
## 2019-09-07 DIAGNOSIS — G31.84: ICD-10-CM

## 2019-09-07 DIAGNOSIS — S70.01XA: ICD-10-CM

## 2019-09-07 DIAGNOSIS — S01.111A: ICD-10-CM

## 2019-09-07 DIAGNOSIS — S32.511A: Primary | ICD-10-CM

## 2019-09-07 DIAGNOSIS — S09.90XA: ICD-10-CM
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
## 2019-09-07 DIAGNOSIS — M19.90: ICD-10-CM

## 2019-09-07 DIAGNOSIS — Z82.49: ICD-10-CM
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
## 2019-09-07 DIAGNOSIS — I10: ICD-10-CM
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
## 2019-09-07 DIAGNOSIS — S01.81XA: ICD-10-CM

## 2019-09-07 DIAGNOSIS — E78.2: ICD-10-CM

## 2019-09-07 PROCEDURE — 80048 BASIC METABOLIC PNL TOTAL CA: CPT

## 2019-09-07 PROCEDURE — 83036 HEMOGLOBIN GLYCOSYLATED A1C: CPT

## 2019-09-07 PROCEDURE — 72170 X-RAY EXAM OF PELVIS: CPT

## 2019-09-07 PROCEDURE — 82962 GLUCOSE BLOOD TEST: CPT

## 2019-09-07 PROCEDURE — 36415 COLL VENOUS BLD VENIPUNCTURE: CPT

## 2019-09-07 PROCEDURE — 85025 COMPLETE CBC W/AUTO DIFF WBC: CPT

## 2019-09-07 PROCEDURE — 70450 CT HEAD/BRAIN W/O DYE: CPT

## 2019-09-07 PROCEDURE — 99284 EMERGENCY DEPT VISIT MOD MDM: CPT

## 2019-09-07 PROCEDURE — 99285 EMERGENCY DEPT VISIT HI MDM: CPT

## 2019-09-07 NOTE — XRAY REPORT
Pelvis AP [id number]



INDICATION: Fall, right hip pain



COMPARISON: None



FINDINGS: Mild lower lumbar degenerative changes are seen. Mild bilateral hip degenerative changes ar
e noted. Mild bilateral sacroiliac arthritic changes are seen. No fractures or dislocations are noted
.



Signer Name: Rahat Pimentel MD 

Signed: 9/7/2019 10:58 PM

 Workstation Name: [id number]
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
## 2019-09-09 LAB
BASOPHILS # (AUTO): 0 K/MM3 (ref 0–0.1)
BASOPHILS NFR BLD AUTO: 0.2 % (ref 0–1.8)
BUN SERPL-MCNC: 15 MG/DL (ref 7–17)
BUN/CREAT SERPL: 30 %
CALCIUM SERPL-MCNC: 8.7 MG/DL (ref 8.4–10.2)
EOSINOPHIL # BLD AUTO: 0.1 K/MM3 (ref 0–0.4)
EOSINOPHIL NFR BLD AUTO: 1.3 % (ref 0–4.3)
HCT VFR BLD CALC: 30.3 % (ref 30.3–42.9)
HEMOLYSIS INDEX: 7
HGB BLD-MCNC: 10.3 GM/DL (ref 10.1–14.3)
LYMPHOCYTES # BLD AUTO: 1.1 K/MM3 (ref 1.2–5.4)
LYMPHOCYTES NFR BLD AUTO: 21.5 % (ref 13.4–35)
MCHC RBC AUTO-ENTMCNC: 34 % (ref 30–34)
MCV RBC AUTO: 95 FL (ref 79–97)
MONOCYTES # (AUTO): 0.6 K/MM3 (ref 0–0.8)
MONOCYTES % (AUTO): 10.9 % (ref 0–7.3)
PLATELET # BLD: 130 K/MM3 (ref 140–440)
RBC # BLD AUTO: 3.19 M/MM3 (ref 3.65–5.03)

## 2019-09-09 PROCEDURE — 0HQ1XZZ REPAIR FACE SKIN, EXTERNAL APPROACH: ICD-10-PCS | Performed by: INTERNAL MEDICINE

## 2019-09-09 RX ADMIN — FAMOTIDINE SCH MG: 20 TABLET ORAL at 23:13

## 2019-09-09 RX ADMIN — Medication SCH ML: at 23:13

## 2019-09-09 RX ADMIN — PRAVASTATIN SODIUM SCH: 80 TABLET ORAL at 21:54

## 2019-09-09 RX ADMIN — VERAPAMIL HYDROCHLORIDE SCH: 180 TABLET, FILM COATED, EXTENDED RELEASE ORAL at 21:53

## 2019-09-09 RX ADMIN — Medication SCH: at 21:53

## 2019-09-09 NOTE — EMERGENCY DEPARTMENT REPORT
Blank Doc





- Documentation


Documentation: 





The patient finally allowed the staff to do a CT of her lower extremity last n

ight.  The CT results showed that the patient has some type fractures of the 

medial aspects of the superior and inferior pubic rami.  His type or fractures. 

Did not need surgery and although painful treatment as pain control and the use 

of a walker.  The patient will be evaluated by

## 2019-09-09 NOTE — CAT SCAN REPORT
CT right hip



INDICATION: Fall yesterday, right hip and lower extremity pain



COMPARISON: Pelvic radiograph 9/7/2019



Contrast: None



All CT scans at this location are performed using CT dose reduction for ALARA by means of automated e
xposure control.



Small amount of free fluid is seen within the pelvis. No pelvic hematoma is noted. Diffuse subcutaneo
us edema is seen. Urinary bladder appears intact. Mild colonic diverticulosis is seen without obvious
 inflammation. Appendix appears within normal limits. Atherosclerotic changes are noted.



Mild lower lumbar degenerative and arthritic changes are noted. Mild right hip degenerative changes a
re seen. No hip fractures are seen. However, is a subtle nondisplaced fracture involving the extreme 
medial aspect of the superior pubic ramus as it meets the symphysis pubis and just inferior to this i
s a subtle nondisplaced fracture of the medial aspect of the right inferior pubic ramus. Even in retr
ospect these are not visible on the prior radiograph.



IMPRESSION: Subtle fractures of the medial aspects of the superior and inferior pubic rami



Signer Name: Rahat Pimentel MD 

Signed: 9/9/2019 12:50 AM

 Workstation Name: [id number]

## 2019-09-10 RX ADMIN — Medication SCH ML: at 22:53

## 2019-09-10 RX ADMIN — OXYCODONE AND ACETAMINOPHEN PRN TAB: 5; 325 TABLET ORAL at 09:33

## 2019-09-10 RX ADMIN — FAMOTIDINE SCH MG: 20 TABLET ORAL at 09:16

## 2019-09-10 RX ADMIN — PRAVASTATIN SODIUM SCH MG: 80 TABLET ORAL at 22:52

## 2019-09-10 RX ADMIN — VERAPAMIL HYDROCHLORIDE SCH MG: 180 TABLET, FILM COATED, EXTENDED RELEASE ORAL at 14:06

## 2019-09-10 RX ADMIN — OXYCODONE AND ACETAMINOPHEN PRN TAB: 5; 325 TABLET ORAL at 09:55

## 2019-09-10 RX ADMIN — Medication SCH UNIT: at 09:15

## 2019-09-10 RX ADMIN — FAMOTIDINE SCH MG: 20 TABLET ORAL at 22:52

## 2019-09-10 RX ADMIN — Medication SCH ML: at 09:16

## 2019-09-10 RX ADMIN — SODIUM CHLORIDE SCH MLS/HR: 0.9 INJECTION, SOLUTION INTRAVENOUS at 08:44

## 2019-09-10 RX ADMIN — SODIUM CHLORIDE SCH MLS/HR: 0.9 INJECTION, SOLUTION INTRAVENOUS at 22:51
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
## 2019-09-11 RX ADMIN — OXYCODONE AND ACETAMINOPHEN PRN TAB: 5; 325 TABLET ORAL at 15:19

## 2019-09-11 RX ADMIN — VERAPAMIL HYDROCHLORIDE SCH MG: 180 TABLET, FILM COATED, EXTENDED RELEASE ORAL at 09:29

## 2019-09-11 RX ADMIN — FAMOTIDINE SCH MG: 20 TABLET ORAL at 09:30

## 2019-09-11 RX ADMIN — Medication SCH UNIT: at 09:29

## 2019-09-11 RX ADMIN — FAMOTIDINE SCH MG: 20 TABLET ORAL at 22:00

## 2019-09-11 RX ADMIN — Medication SCH ML: at 22:00

## 2019-09-11 RX ADMIN — Medication SCH ML: at 09:30

## 2019-09-11 RX ADMIN — VERAPAMIL HYDROCHLORIDE SCH: 180 TABLET, FILM COATED, EXTENDED RELEASE ORAL at 09:37

## 2019-09-11 RX ADMIN — PRAVASTATIN SODIUM SCH MG: 80 TABLET ORAL at 22:00
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
## 2019-09-12 RX ADMIN — Medication SCH ML: at 22:03

## 2019-09-12 RX ADMIN — Medication SCH UNIT: at 10:32

## 2019-09-12 RX ADMIN — OXYCODONE AND ACETAMINOPHEN PRN TAB: 5; 325 TABLET ORAL at 22:04

## 2019-09-12 RX ADMIN — PRAVASTATIN SODIUM SCH MG: 80 TABLET ORAL at 22:03

## 2019-09-12 RX ADMIN — Medication SCH ML: at 10:32

## 2019-09-12 RX ADMIN — SODIUM CHLORIDE SCH MLS/HR: 0.9 INJECTION, SOLUTION INTRAVENOUS at 06:05

## 2019-09-12 RX ADMIN — FAMOTIDINE SCH MG: 20 TABLET ORAL at 10:32

## 2019-09-12 RX ADMIN — SODIUM CHLORIDE SCH MLS/HR: 0.9 INJECTION, SOLUTION INTRAVENOUS at 19:35

## 2019-09-12 RX ADMIN — FAMOTIDINE SCH MG: 20 TABLET ORAL at 22:03
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
## 2019-09-13 RX ADMIN — Medication SCH: at 21:49

## 2019-09-13 RX ADMIN — FAMOTIDINE SCH MG: 20 TABLET ORAL at 21:49

## 2019-09-13 RX ADMIN — AMLODIPINE BESYLATE SCH MG: 10 TABLET ORAL at 17:33

## 2019-09-13 RX ADMIN — FAMOTIDINE SCH MG: 20 TABLET ORAL at 09:25

## 2019-09-13 RX ADMIN — PRAVASTATIN SODIUM SCH MG: 80 TABLET ORAL at 21:44

## 2019-09-13 RX ADMIN — SODIUM CHLORIDE SCH MLS/HR: 0.9 INJECTION, SOLUTION INTRAVENOUS at 18:42

## 2019-09-13 RX ADMIN — OXYCODONE AND ACETAMINOPHEN PRN TAB: 5; 325 TABLET ORAL at 20:14

## 2019-09-13 RX ADMIN — SODIUM CHLORIDE SCH MLS/HR: 0.9 INJECTION, SOLUTION INTRAVENOUS at 07:27

## 2019-09-13 RX ADMIN — Medication SCH UNIT: at 09:25

## 2019-09-13 RX ADMIN — Medication SCH ML: at 09:25
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
## 2019-09-14 RX ADMIN — FAMOTIDINE SCH MG: 20 TABLET ORAL at 09:59

## 2019-09-14 RX ADMIN — Medication SCH ML: at 09:59

## 2019-09-14 RX ADMIN — Medication SCH UNIT: at 10:51

## 2019-09-14 RX ADMIN — FAMOTIDINE SCH MG: 20 TABLET ORAL at 21:13

## 2019-09-14 RX ADMIN — PRAVASTATIN SODIUM SCH MG: 80 TABLET ORAL at 21:13

## 2019-09-14 RX ADMIN — OXYCODONE AND ACETAMINOPHEN PRN TAB: 5; 325 TABLET ORAL at 21:16

## 2019-09-14 RX ADMIN — AMLODIPINE BESYLATE SCH MG: 10 TABLET ORAL at 09:59

## 2019-09-14 RX ADMIN — SODIUM CHLORIDE SCH MLS/HR: 0.9 INJECTION, SOLUTION INTRAVENOUS at 04:37
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
## 2019-09-15 RX ADMIN — FAMOTIDINE SCH MG: 20 TABLET ORAL at 21:49

## 2019-09-15 RX ADMIN — Medication SCH: at 00:07

## 2019-09-15 RX ADMIN — SODIUM CHLORIDE SCH MLS/HR: 0.9 INJECTION, SOLUTION INTRAVENOUS at 00:06

## 2019-09-15 RX ADMIN — FAMOTIDINE SCH: 20 TABLET ORAL at 11:35

## 2019-09-15 RX ADMIN — FAMOTIDINE SCH MG: 20 TABLET ORAL at 09:23

## 2019-09-15 RX ADMIN — Medication SCH UNIT: at 09:23

## 2019-09-15 RX ADMIN — Medication SCH ML: at 22:13

## 2019-09-15 RX ADMIN — Medication SCH ML: at 09:23

## 2019-09-15 RX ADMIN — AMLODIPINE BESYLATE SCH MG: 10 TABLET ORAL at 09:23

## 2019-09-15 RX ADMIN — SODIUM CHLORIDE SCH MLS/HR: 0.9 INJECTION, SOLUTION INTRAVENOUS at 12:42

## 2019-09-15 RX ADMIN — AMLODIPINE BESYLATE SCH: 10 TABLET ORAL at 11:35

## 2019-09-15 RX ADMIN — OXYCODONE AND ACETAMINOPHEN PRN TAB: 5; 325 TABLET ORAL at 21:49

## 2019-09-15 RX ADMIN — PRAVASTATIN SODIUM SCH MG: 80 TABLET ORAL at 21:49

## 2019-09-15 RX ADMIN — Medication SCH: at 22:14
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
## 2019-09-16 VITALS — SYSTOLIC BLOOD PRESSURE: 141 MMHG | DIASTOLIC BLOOD PRESSURE: 77 MMHG

## 2019-09-16 RX ADMIN — SODIUM CHLORIDE SCH MLS/HR: 0.9 INJECTION, SOLUTION INTRAVENOUS at 02:39

## 2019-09-16 RX ADMIN — Medication SCH ML: at 11:29

## 2019-09-16 RX ADMIN — FAMOTIDINE SCH MG: 20 TABLET ORAL at 11:29

## 2019-09-16 RX ADMIN — Medication SCH UNIT: at 10:55

## 2019-09-16 RX ADMIN — OXYCODONE AND ACETAMINOPHEN PRN TAB: 5; 325 TABLET ORAL at 06:10

## 2019-09-16 RX ADMIN — AMLODIPINE BESYLATE SCH MG: 10 TABLET ORAL at 11:29
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
